# Patient Record
Sex: MALE | URBAN - METROPOLITAN AREA
[De-identification: names, ages, dates, MRNs, and addresses within clinical notes are randomized per-mention and may not be internally consistent; named-entity substitution may affect disease eponyms.]

---

## 2019-09-03 ENCOUNTER — TELEPHONE (OUTPATIENT)
Dept: GASTROENTEROLOGY | Facility: CLINIC | Age: 34
End: 2019-09-03

## 2019-09-23 NOTE — TELEPHONE ENCOUNTER
Dr Riki Tracy reach pt by phone-pcp has same number on file-there has been no response from recall letter   Please advise-thank you